# Patient Record
(demographics unavailable — no encounter records)

---

## 2018-01-11 NOTE — DIAGNOSTIC IMAGING REPORT
EXAM: CT ABDOMEN AND PELVIS with IV CONTRAST

DATE: 1/11/2018 12:00 AM  Time stamp on Exam: 2054 hours

INDICATION:  Right lower quadrant pain for 5 days

COMPARISON:  None 

TECHNIQUE: The abdomen and pelvis were scanned using a multidetector helical

scanner. Coronal and sagittal reformations were obtained. Routine protocol

performed.

IV Contrast: 100 cc Isovue 370

Oral Contrast: Water

CTDIvol has been reviewed. It is below the limits set by the Radiation Protocol

Committee (RPC).



FINDINGS:

LOWER THORAX: No consolidations



LIVER: No masses

BILIARY: The gallbladder is unremarkable. No ductal dilation. 



SPLEEN: No masses

PANCREAS: No masses



ADRENALS: No nodules

KIDNEYS: Symmetric perfusion. No enhancing masses. No hydronephrosis.



GI TRACT: No distention, wall thickening or evidence of obstruction. Normal

appendix.



VESSELS: Unremarkable

PERITONEUM/RETROPERITONEUM: Trace amount of free pelvic fluid. No free air.

LYMPH NODES: No lymphadenopathy



REPRODUCTIVE ORGANS: The uterus and left ovary are unremarkable. Within the

right adnexa there is an irregular 4 cm cystic and tubular structure with

surrounding inflammation.



BLADDER: Unremarkable



SOFT TISSUES: Unremarkable

BONES: No suspicious bone lesions.



IMPRESSION:

Abnormal cystic and tubular structure in the right adnexa with adjacent

inflammation. The differential includes pelvic inflammatory disease with

abscess, pyosalpinx or right ovarian torsion.



Normal appendix.



Signed by: Dr. Eunice Pérez M.D. on 1/11/2018 9:19 PM

## 2018-02-01 NOTE — XMS REPORT
Patient Summary Document

 Created on: 2018



YAKOV PHILLIPS

External Reference #: [id number]

: 1977

Sex: Female



Demographics







 Address  [address] [place], [place] [id number]

 

 Home Phone  [phone]

 

 Preferred Language  Unknown

 

 Marital Status  Unknown

 

 [employer] Affiliation  Unknown

 

 Race  Unknown

 

 Additional Race(s)  

 

 Ethnic Group  Unknown





Author







 Author  [facility]

 

 Address  Unknown

 

 Phone  Unavailable







Care Team Providers







 Care Team Member Name  Role  Phone

 

 MATTHEW RALPH  Unavailable  Unavailable







Problems

This patient has no known problems.



Allergies, Adverse Reactions, Alerts

This patient has no known allergies or adverse reactions.



Medications

This patient has no known medications.



Results







 Test Description  Test Time  Test Comments  Text Results  Atomic Results  
Result Comments









 CT ABDOMEN/PELVIS W            [place] [address]      Patient Name: YAKOV PHILLIPS   MR #: [id number]    : 1977 Age/Sex: 40/F  Acct #: 
[id number] Req #: [id number]  Adm Physician:     Ordered by: MATTHEW RALPH MD  Report #: [id number]   Location: CT  Room/Bed:     __________________________
_________________________________________________________________________    
Procedure: [id number] CT/CT ABDOMEN/PELVIS W  Exam Date: 18               
             Exam Time:        REPORT STATUS: Signed    EXAM: CT ABDOMEN 
AND PELVIS with IV CONTRAST   DATE: 2018 12:00 AM  Time stamp on Exam: 
 hours   INDICATION:  Right lower quadrant pain for 5 days   COMPARISON:  
None    TECHNIQUE: The abdomen and pelvis were scanned using a multidetector 
helical   scanner. Coronal and sagittal reformations were obtained. Routine 
protocol   performed.   IV Contrast: 100 cc Isovue 370   Oral Contrast: Water   
CTDIvol has been reviewed. It is below the limits set by the Radiation Protocol
   Committee (RPC).      FINDINGS:   LOWER THORAX: No consolidations      LIVER
: No masses   BILIARY: The gallbladder is unremarkable. No ductal dilation.    
   SPLEEN: No masses   PANCREAS: No masses      ADRENALS: No nodules   KIDNEYS: 
Symmetric perfusion. No enhancing masses. No hydronephrosis.      GI TRACT: No 
distention, wall thickening or evidence of obstruction. Normal   appendix.      
VESSELS: Unremarkable   PERITONEUM/RETROPERITONEUM: Trace amount of free pelvic 
fluid. No free air.   LYMPH NODES: No lymphadenopathy      REPRODUCTIVE ORGANS: 
The uterus and left ovary are unremarkable. Within the   right adnexa there is 
an irregular 4 cm cystic and tubular structure with   surrounding inflammation.
      BLADDER: Unremarkable      SOFT TISSUES: Unremarkable   BONES: No 
suspicious bone lesions.      IMPRESSION:   Abnormal cystic and tubular 
structure in the right adnexa with adjacent   inflammation. The differential 
includes pelvic inflammatory disease with   abscess, pyosalpinx or right 
ovarian torsion.      Normal appendix.      Signed by: Dr. Isaac Chua M.D. on 2018 9:19 PM        Dictated By: ISAAC CHUA MD  Electronically 
Signed By: ISAAC CHUA MD on 18  Transcribed By: ORA on 2119       COPY TO:   MATTHEW RALPH MD

## 2018-02-01 NOTE — DIAGNOSTIC IMAGING REPORT
PROCEDURE: CT ABDOMEN AND PELVIS WITH CONTRAST

 

TECHNIQUE: 

The abdomen and pelvis were scanned utilizing a multidetector helical 

scanner from the diaphragm to the lesser trochanter after the IV 

administration of 100 cc of Isovue 370 and the oral administration of 

Gastroview.  Coronal and sagittal multiplanar reformations were 

obtained.

DLP: 284.6 mGy-cm

 

COMPARISON: Abdominal CT 1/11/2018.

 

INDICATIONS:   DIVERTICULITIS

 

FINDINGS:

LOWER THORAX: Normal.

 

HEPATOBILIARY: No focal hepatic lesions.  No biliary ductal dilatation.

SPLEEN: No splenomegaly.

PANCREAS: No focal masses or ductal dilatation.

 

ADRENALS: No adrenal nodules.

KIDNEYS/URETERS: No hydronephrosis, stones, or solid mass lesions.

PELVIC ORGANS/BLADDER: Previous right adnexal cystic structure is no 

longer visualized. A new hyperattenuating 1.4 cm lesion in the left 

ovary may represent a hemorrhagic cyst but is suboptimally evaluated 

with CT. 

 

PERITONEUM / RETROPERITONEUM: No free air or fluid.

LYMPH NODES: Increased size of a nonenlarged right lower quadrant 

mesenteric lymph node measuring 0.9 cm, likely reactive

VESSELS: Unremarkable.

 

GI TRACT: No evidence of bowel obstruction or diverticulitis. Diffuse 

mild colonic wall and haustral thickening throughout the colon, most 

prominent in the ascending colon and cecum. Appendix is completely 

filled with enteric contrast and normal. 

 

BONES AND SOFT TISSUES: Unremarkable.

 

IMPRESSION:

1. Resolution of the previous right adnexal cystic structure with 

adjacent stranding. 

2. No evidence of diverticulitis. Diffuse colonic wall thickening is 

concerning for colitis.  In the setting of possible antibiotic 

administration for the previous right adnexal process, consider 

correlation for possibility of C. difficile.   

3. New hyperattenuating 1.4 cm lesion in the left ovary may represent a 

hemorrhagic cyst. Consider follow up ultrasound in 8-12 weeks. 

 

 

 

 

Dictated by:  Didier Avendano M.D. on 2/01/2018 at 14:52     

Electronically approved by:  Didier Avendano M.D. on 2/01/2018 at 14:52